# Patient Record
Sex: FEMALE | URBAN - NONMETROPOLITAN AREA
[De-identification: names, ages, dates, MRNs, and addresses within clinical notes are randomized per-mention and may not be internally consistent; named-entity substitution may affect disease eponyms.]

---

## 2023-05-18 ENCOUNTER — OFFICE VISIT (OUTPATIENT)
Dept: OBGYN CLINIC | Age: 28
End: 2023-05-18

## 2023-05-18 VITALS — SYSTOLIC BLOOD PRESSURE: 80 MMHG | WEIGHT: 132.6 LBS | DIASTOLIC BLOOD PRESSURE: 50 MMHG

## 2023-05-18 DIAGNOSIS — Z78.9 LANGUAGE BARRIER: ICD-10-CM

## 2023-05-18 DIAGNOSIS — N92.6 IRREGULAR PERIODS/MENSTRUAL CYCLES: ICD-10-CM

## 2023-05-18 DIAGNOSIS — Z31.49 PROCREATION MANAGEMENT INVESTIGATION AND TESTING: Primary | ICD-10-CM

## 2023-05-18 RX ORDER — PNV NO.95/FERROUS FUM/FOLIC AC 28MG-0.8MG
1 TABLET ORAL DAILY
Qty: 30 TABLET | Refills: 12 | Status: SHIPPED | OUTPATIENT
Start: 2023-05-18

## 2023-05-18 ASSESSMENT — ENCOUNTER SYMPTOMS
GASTROINTESTINAL NEGATIVE: 1
RESPIRATORY NEGATIVE: 1

## 2023-05-18 NOTE — PROGRESS NOTES
PROBLEM VISIT     Date of service: 2023    Casey Wheeler  Is a 32 y.o.  female    PT's PCP is: No primary care provider on file. : 1995                                          HPI New patient to our office. Walked into office yesterday requesting an appt (was put into schedule for today) and front office staff thought patient relayed was having a miscarriage. She does not speak [place]. Using  ad today through [facility] ([address], session [id number]). Was able to obtain history of patient although multiple discrepancies including age of her daughter despite  use. Reports that has irregular cycles - often every other month has a period. Reports that most cycles are 3 days long but LMP -. While trying to obtain more menses history patient kept repeating \"do not understand\". Some dysmenorrhea with cycles, normal flow, no clots present. Reports that first pregnancy (told nurse age 7 and told me age 3) was conceived without difficulty. Has been TTC x 5 years - 2-3 with current partner. Current partner is 22year old, no children. Has intercourse daily with partner. Does not have a PCP nor any insurance currently and has paperwork for address/contact of Job/Family Services  Diet - high in carbs - her recall rice and beans then stated \"any type of food\"  Does not exercise  Denies ETOH/drug/tobacco use  Partner uses some tobacco and ETOH  Not taking PNV. Review of Systems   Constitutional: Negative. HENT: Negative. Respiratory: Negative. Cardiovascular: Negative. Gastrointestinal: Negative. Genitourinary:  Positive for menstrual problem (Irregular cycles). Negative for pelvic pain. Neurological: Negative. Psychiatric/Behavioral: Negative. Patient's last menstrual period was 2023.    OB History    Para Term  AB Living   1         1   SAB IAB Ectopic Molar Multiple Live Births